# Patient Record
Sex: MALE | Race: WHITE | NOT HISPANIC OR LATINO | Employment: FULL TIME | URBAN - METROPOLITAN AREA
[De-identification: names, ages, dates, MRNs, and addresses within clinical notes are randomized per-mention and may not be internally consistent; named-entity substitution may affect disease eponyms.]

---

## 2021-12-13 ENCOUNTER — HOSPITAL ENCOUNTER (EMERGENCY)
Facility: MEDICAL CENTER | Age: 46
End: 2021-12-13
Attending: EMERGENCY MEDICINE
Payer: MEDICAID

## 2021-12-13 VITALS
WEIGHT: 283.51 LBS | OXYGEN SATURATION: 91 % | HEIGHT: 72 IN | TEMPERATURE: 97.8 F | DIASTOLIC BLOOD PRESSURE: 96 MMHG | SYSTOLIC BLOOD PRESSURE: 137 MMHG | HEART RATE: 87 BPM | BODY MASS INDEX: 38.4 KG/M2 | RESPIRATION RATE: 19 BRPM

## 2021-12-13 DIAGNOSIS — R19.7 DIARRHEA, UNSPECIFIED TYPE: ICD-10-CM

## 2021-12-13 DIAGNOSIS — R73.9 HYPERGLYCEMIA: ICD-10-CM

## 2021-12-13 LAB
GLUCOSE BLD-MCNC: 181 MG/DL (ref 65–99)
SARS-COV-2 RNA RESP QL NAA+PROBE: NOTDETECTED
SPECIMEN SOURCE: NORMAL

## 2021-12-13 PROCEDURE — U0003 INFECTIOUS AGENT DETECTION BY NUCLEIC ACID (DNA OR RNA); SEVERE ACUTE RESPIRATORY SYNDROME CORONAVIRUS 2 (SARS-COV-2) (CORONAVIRUS DISEASE [COVID-19]), AMPLIFIED PROBE TECHNIQUE, MAKING USE OF HIGH THROUGHPUT TECHNOLOGIES AS DESCRIBED BY CMS-2020-01-R: HCPCS

## 2021-12-13 PROCEDURE — 99283 EMERGENCY DEPT VISIT LOW MDM: CPT

## 2021-12-13 PROCEDURE — U0005 INFEC AGEN DETEC AMPLI PROBE: HCPCS

## 2021-12-13 PROCEDURE — 82962 GLUCOSE BLOOD TEST: CPT

## 2021-12-13 ASSESSMENT — LIFESTYLE VARIABLES: DO YOU DRINK ALCOHOL: NO

## 2021-12-13 NOTE — ED TRIAGE NOTES
Chief Complaint   Patient presents with   • Medical Clearance     pt states he was sick with the stomach flu and headache.  Pt states he had diarrhea for two days.  Pt states he thinks he ate bad grease.      Pt ambulatory to triage for above complaint. Pts heart rate is 125bpm currently.  Pt states he normally has elevated heart rate and blood pressure.       Pt is alert/oriented and follows commands. Pt speaking in full sentences and responds appropriately to questions. No acute distress noted in triage and respirations are even and unlabored.     Pt placed in lobby and educated on triage process. Pt encouraged to alert staff for any changes in condition.

## 2021-12-13 NOTE — ED PROVIDER NOTES
ED Provider Note    CHIEF COMPLAINT  Chief Complaint   Patient presents with   • Letter for School/Work     pt states he was sick with the stomach flu and headache. Current heart rate 125bpm. Pt states he had diarrhea for two days.  Pt states he thinks he ate bad grease.        HPI  Livan Tesfaye is a 46 y.o. male who presents with diarrhea.  He mainly wants a note to go back to work.  He started getting sick 3 days ago.  He had diarrhea for 1 day.  He has not had any diarrhea for almost 24 hours now.  He felt nauseous but did not vomit.  Said he clear his throat a few times, but no significant cough.  Minor runny nose.  No fevers.  He is not vaccinated against Covid.  He has no known ill contacts.  States he has had a dry mouth but has been drinking a lot of water.  Denies dysuria, hematuria frequency.  No abdominal pain.  Denies chest pain, shortness of breath.  No hemoptysis.  No leg swelling or leg pain.    REVIEW OF SYSTEMS  Pertinent negative: As above all other systems reviewed and negative    PAST MEDICAL HISTORY  Past Medical History:   Diagnosis Date   • Hypertension        SOCIAL HISTORY  Social History     Tobacco Use   • Smoking status: Current Every Day Smoker   • Smokeless tobacco: Never Used   • Tobacco comment: Rarely, 1 cig/week   Vaping Use   • Vaping Use: Every day   • Substances: THC   Substance Use Topics   • Alcohol use: Yes     Comment: socially   • Drug use: Yes     Comment: marijuana       SURGICAL HISTORY  History reviewed. No pertinent surgical history.    ALLERGIES  No Known Allergies    PHYSICAL EXAM  VITAL SIGNS: /96   Pulse 87   Temp 36.6 °C (97.8 °F) (Temporal)   Resp 19   Ht 1.829 m (6')   Wt (!) 129 kg (283 lb 8.2 oz)   SpO2 91%   BMI 38.45 kg/m²    Constitutional: Awake and alert. Nontoxic  HENT:  Grossly normal  Eyes: Grossly normal  Neck: Normal range of motion  Cardiovascular: Elevated heart rate initially  Thorax & Lungs: No respiratory distress  Abdomen:  Nontender  Skin:  No pathologic rash.   Extremities: Well perfused  Psychiatric: Affect normal        Labs:  Results for orders placed or performed during the hospital encounter of 12/13/21   POCT glucose device results   Result Value Ref Range    Glucose - Accu-Ck 181 (H) 65 - 99 mg/dL        COURSE & MEDICAL DECISION MAKING  Patient presents for a note for work.  He was quite tachycardic on arrival.  He did not appear dramatically dehydrated and his heart rate was improved even prior to intervention.  Checked a blood sugar and it is moderately elevated at 181.  Has not eaten today.  Suspect early onset diabetes.  Its not elevated to a degree where I would expect DKA.  The patient was given oral fluids in the ER.  His heart rate normalized.  A Covid screen will be sent.  Patient is released to go back to work tomorrow if he does not have any further diarrhea and his Covid test is negative.  I discussed this with him.  He states he is getting insurance next week.  He will follow up with a primary provider for additional diagnostic testing and health maintenance.  I precautioned him to return to the ER for any fever, abdominal pain, recurrent symptoms or concern.    FINAL IMPRESSION  1.  Diarrhea, suspected viral illness  2.  Mild hyperglycemia      Disposition: home in good condition      This dictation was created using voice recognition software. The accuracy of the dictation is limited to the abilities of the software.  The nursing notes were reviewed and certain aspects of this information were incorporated into this note.      Electronically signed by: Marco Antonio Reaves M.D., 12/13/2021 8:18 AM

## 2024-10-15 ENCOUNTER — HOSPITAL ENCOUNTER (EMERGENCY)
Facility: MEDICAL CENTER | Age: 49
End: 2024-10-15
Attending: EMERGENCY MEDICINE
Payer: MEDICAID

## 2024-10-15 ENCOUNTER — APPOINTMENT (OUTPATIENT)
Dept: RADIOLOGY | Facility: MEDICAL CENTER | Age: 49
End: 2024-10-15
Attending: EMERGENCY MEDICINE
Payer: MEDICAID

## 2024-10-15 VITALS
HEIGHT: 72 IN | HEART RATE: 66 BPM | RESPIRATION RATE: 18 BRPM | BODY MASS INDEX: 37.47 KG/M2 | OXYGEN SATURATION: 97 % | WEIGHT: 276.68 LBS | DIASTOLIC BLOOD PRESSURE: 73 MMHG | SYSTOLIC BLOOD PRESSURE: 116 MMHG | TEMPERATURE: 97.4 F

## 2024-10-15 DIAGNOSIS — R60.0 PEDAL EDEMA: ICD-10-CM

## 2024-10-15 LAB
ALBUMIN SERPL BCP-MCNC: 4.5 G/DL (ref 3.2–4.9)
ALBUMIN/GLOB SERPL: 1.5 G/DL
ALP SERPL-CCNC: 107 U/L (ref 30–99)
ALT SERPL-CCNC: 13 U/L (ref 2–50)
ANION GAP SERPL CALC-SCNC: 14 MMOL/L (ref 7–16)
AST SERPL-CCNC: 10 U/L (ref 12–45)
BASOPHILS # BLD AUTO: 0.8 % (ref 0–1.8)
BASOPHILS # BLD: 0.04 K/UL (ref 0–0.12)
BILIRUB SERPL-MCNC: 0.4 MG/DL (ref 0.1–1.5)
BUN SERPL-MCNC: 14 MG/DL (ref 8–22)
CALCIUM ALBUM COR SERPL-MCNC: 8.8 MG/DL (ref 8.5–10.5)
CALCIUM SERPL-MCNC: 9.2 MG/DL (ref 8.5–10.5)
CHLORIDE SERPL-SCNC: 103 MMOL/L (ref 96–112)
CO2 SERPL-SCNC: 22 MMOL/L (ref 20–33)
CREAT SERPL-MCNC: 0.82 MG/DL (ref 0.5–1.4)
D DIMER PPP IA.FEU-MCNC: 0.61 UG/ML (FEU) (ref 0–0.5)
EOSINOPHIL # BLD AUTO: 0.11 K/UL (ref 0–0.51)
EOSINOPHIL NFR BLD: 2.3 % (ref 0–6.9)
ERYTHROCYTE [DISTWIDTH] IN BLOOD BY AUTOMATED COUNT: 45.5 FL (ref 35.9–50)
GFR SERPLBLD CREATININE-BSD FMLA CKD-EPI: 109 ML/MIN/1.73 M 2
GLOBULIN SER CALC-MCNC: 3 G/DL (ref 1.9–3.5)
GLUCOSE SERPL-MCNC: 95 MG/DL (ref 65–99)
HCT VFR BLD AUTO: 46.9 % (ref 42–52)
HGB BLD-MCNC: 15.2 G/DL (ref 14–18)
IMM GRANULOCYTES # BLD AUTO: 0.01 K/UL (ref 0–0.11)
IMM GRANULOCYTES NFR BLD AUTO: 0.2 % (ref 0–0.9)
LYMPHOCYTES # BLD AUTO: 1.94 K/UL (ref 1–4.8)
LYMPHOCYTES NFR BLD: 39.8 % (ref 22–41)
MCH RBC QN AUTO: 29 PG (ref 27–33)
MCHC RBC AUTO-ENTMCNC: 32.4 G/DL (ref 32.3–36.5)
MCV RBC AUTO: 89.3 FL (ref 81.4–97.8)
MONOCYTES # BLD AUTO: 0.31 K/UL (ref 0–0.85)
MONOCYTES NFR BLD AUTO: 6.4 % (ref 0–13.4)
NEUTROPHILS # BLD AUTO: 2.46 K/UL (ref 1.82–7.42)
NEUTROPHILS NFR BLD: 50.5 % (ref 44–72)
NRBC # BLD AUTO: 0 K/UL
NRBC BLD-RTO: 0 /100 WBC (ref 0–0.2)
NT-PROBNP SERPL IA-MCNC: 43 PG/ML (ref 0–125)
PLATELET # BLD AUTO: 210 K/UL (ref 164–446)
PMV BLD AUTO: 9.5 FL (ref 9–12.9)
POTASSIUM SERPL-SCNC: 3.9 MMOL/L (ref 3.6–5.5)
PROT SERPL-MCNC: 7.5 G/DL (ref 6–8.2)
RBC # BLD AUTO: 5.25 M/UL (ref 4.7–6.1)
SODIUM SERPL-SCNC: 139 MMOL/L (ref 135–145)
WBC # BLD AUTO: 4.9 K/UL (ref 4.8–10.8)

## 2024-10-15 PROCEDURE — 85025 COMPLETE CBC W/AUTO DIFF WBC: CPT

## 2024-10-15 PROCEDURE — 83880 ASSAY OF NATRIURETIC PEPTIDE: CPT

## 2024-10-15 PROCEDURE — 80053 COMPREHEN METABOLIC PANEL: CPT

## 2024-10-15 PROCEDURE — 85379 FIBRIN DEGRADATION QUANT: CPT

## 2024-10-15 PROCEDURE — 99283 EMERGENCY DEPT VISIT LOW MDM: CPT

## 2024-10-15 PROCEDURE — 93971 EXTREMITY STUDY: CPT | Mod: LT

## 2024-10-15 PROCEDURE — 36415 COLL VENOUS BLD VENIPUNCTURE: CPT

## 2024-10-15 ASSESSMENT — PAIN DESCRIPTION - PAIN TYPE: TYPE: ACUTE PAIN
